# Patient Record
Sex: MALE | Race: WHITE | Employment: OTHER | URBAN - NONMETROPOLITAN AREA
[De-identification: names, ages, dates, MRNs, and addresses within clinical notes are randomized per-mention and may not be internally consistent; named-entity substitution may affect disease eponyms.]

---

## 2017-01-02 ENCOUNTER — OFFICE VISIT (OUTPATIENT)
Dept: URGENT CARE | Age: 37
End: 2017-01-02
Payer: MEDICARE

## 2017-01-02 VITALS
WEIGHT: 155 LBS | HEIGHT: 71 IN | DIASTOLIC BLOOD PRESSURE: 70 MMHG | SYSTOLIC BLOOD PRESSURE: 120 MMHG | RESPIRATION RATE: 18 BRPM | HEART RATE: 70 BPM | OXYGEN SATURATION: 99 % | BODY MASS INDEX: 21.7 KG/M2 | TEMPERATURE: 98 F

## 2017-01-02 DIAGNOSIS — L08.9 SKIN INFECTION: Primary | ICD-10-CM

## 2017-01-02 PROCEDURE — 99213 OFFICE O/P EST LOW 20 MIN: CPT | Performed by: NURSE PRACTITIONER

## 2017-01-02 RX ORDER — SULFAMETHOXAZOLE AND TRIMETHOPRIM 800; 160 MG/1; MG/1
1 TABLET ORAL 2 TIMES DAILY
Qty: 20 TABLET | Refills: 0 | Status: SHIPPED | OUTPATIENT
Start: 2017-01-02 | End: 2017-01-12

## 2017-01-02 ASSESSMENT — ENCOUNTER SYMPTOMS
COUGH: 0
SINUS PRESSURE: 0
GASTROINTESTINAL NEGATIVE: 1
SORE THROAT: 0
RHINORRHEA: 1

## 2017-01-27 ENCOUNTER — TELEPHONE (OUTPATIENT)
Dept: NEUROLOGY | Age: 37
End: 2017-01-27

## 2017-02-20 ENCOUNTER — OFFICE VISIT (OUTPATIENT)
Dept: NEUROLOGY | Age: 37
End: 2017-02-20
Payer: MEDICARE

## 2017-02-20 VITALS
WEIGHT: 160 LBS | BODY MASS INDEX: 22.4 KG/M2 | RESPIRATION RATE: 18 BRPM | HEIGHT: 71 IN | DIASTOLIC BLOOD PRESSURE: 72 MMHG | HEART RATE: 88 BPM | SYSTOLIC BLOOD PRESSURE: 114 MMHG

## 2017-02-20 DIAGNOSIS — F34.1 DYSTHYMIA: ICD-10-CM

## 2017-02-20 DIAGNOSIS — S06.9X5S TRAUMATIC BRAIN INJURY, WITH LOSS OF CONSCIOUSNESS GREATER THAN 24 HOURS WITH RETURN TO PRE-EXISTING CONSCIOUS LEVEL, SEQUELA (HCC): Primary | ICD-10-CM

## 2017-02-20 DIAGNOSIS — R41.3 MEMORY LOSS: ICD-10-CM

## 2017-02-20 DIAGNOSIS — H02.401 PTOSIS OF RIGHT EYELID: ICD-10-CM

## 2017-02-20 DIAGNOSIS — S06.9X5S TRAUMATIC BRAIN INJURY, WITH LOSS OF CONSCIOUSNESS GREATER THAN 24 HOURS WITH RETURN TO PRE-EXISTING CONSCIOUS LEVEL, SEQUELA (HCC): ICD-10-CM

## 2017-02-20 DIAGNOSIS — R43.0 ANOSMIA: ICD-10-CM

## 2017-02-20 LAB
ALBUMIN SERPL-MCNC: 4.8 G/DL (ref 3.5–5.2)
ALP BLD-CCNC: 94 U/L (ref 40–130)
ALT SERPL-CCNC: 13 U/L (ref 5–41)
ANION GAP SERPL CALCULATED.3IONS-SCNC: 16 MMOL/L (ref 7–19)
AST SERPL-CCNC: 15 U/L (ref 5–40)
BASOPHILS ABSOLUTE: 0 K/UL (ref 0–0.2)
BASOPHILS RELATIVE PERCENT: 0.3 % (ref 0–1)
BILIRUB SERPL-MCNC: 0.6 MG/DL (ref 0.2–1.2)
BUN BLDV-MCNC: 20 MG/DL (ref 6–20)
CALCIUM SERPL-MCNC: 9.8 MG/DL (ref 8.6–10)
CHLORIDE BLD-SCNC: 102 MMOL/L (ref 98–111)
CO2: 24 MMOL/L (ref 22–29)
CREAT SERPL-MCNC: 1 MG/DL (ref 0.5–1.2)
EOSINOPHILS ABSOLUTE: 0.1 K/UL (ref 0–0.6)
EOSINOPHILS RELATIVE PERCENT: 1.4 % (ref 0–5)
FOLATE: 15.7 NG/ML (ref 4.5–32.2)
GFR NON-AFRICAN AMERICAN: >60
GLOBULIN: 3 G/DL
GLUCOSE BLD-MCNC: 86 MG/DL (ref 74–109)
HCT VFR BLD CALC: 47.5 % (ref 42–52)
HEMOGLOBIN: 16.1 G/DL (ref 14–18)
LYMPHOCYTES ABSOLUTE: 1.5 K/UL (ref 1.1–4.5)
LYMPHOCYTES RELATIVE PERCENT: 18.9 % (ref 20–40)
MCH RBC QN AUTO: 30 PG (ref 27–31)
MCHC RBC AUTO-ENTMCNC: 33.9 G/DL (ref 33–37)
MCV RBC AUTO: 88.5 FL (ref 80–94)
MONOCYTES ABSOLUTE: 0.6 K/UL (ref 0–0.9)
MONOCYTES RELATIVE PERCENT: 7.1 % (ref 0–10)
NEUTROPHILS ABSOLUTE: 5.7 K/UL (ref 1.5–7.5)
NEUTROPHILS RELATIVE PERCENT: 72.3 % (ref 50–65)
PDW BLD-RTO: 13.5 % (ref 11.5–14.5)
PLATELET # BLD: 308 K/UL (ref 130–400)
PMV BLD AUTO: 10.9 FL (ref 7.4–10.4)
POTASSIUM SERPL-SCNC: 3.6 MMOL/L (ref 3.5–5)
RBC # BLD: 5.37 M/UL (ref 4.7–6.1)
SODIUM BLD-SCNC: 142 MMOL/L (ref 136–145)
TOTAL PROTEIN: 7.8 G/DL (ref 6.6–8.7)
TSH SERPL DL<=0.05 MIU/L-ACNC: 1.18 UIU/ML (ref 0.27–4.2)
VITAMIN B-12: 552 PG/ML (ref 211–946)
WBC # BLD: 7.9 K/UL (ref 4.8–10.8)

## 2017-02-20 PROCEDURE — 4004F PT TOBACCO SCREEN RCVD TLK: CPT | Performed by: PSYCHIATRY & NEUROLOGY

## 2017-02-20 PROCEDURE — 99214 OFFICE O/P EST MOD 30 MIN: CPT | Performed by: PSYCHIATRY & NEUROLOGY

## 2017-02-20 PROCEDURE — G8484 FLU IMMUNIZE NO ADMIN: HCPCS | Performed by: PSYCHIATRY & NEUROLOGY

## 2017-02-20 PROCEDURE — G8420 CALC BMI NORM PARAMETERS: HCPCS | Performed by: PSYCHIATRY & NEUROLOGY

## 2017-02-20 PROCEDURE — G8427 DOCREV CUR MEDS BY ELIG CLIN: HCPCS | Performed by: PSYCHIATRY & NEUROLOGY

## 2017-02-20 RX ORDER — CITALOPRAM 20 MG/1
20 TABLET ORAL DAILY
Qty: 30 TABLET | Refills: 3 | Status: SHIPPED | OUTPATIENT
Start: 2017-02-20 | End: 2017-06-08 | Stop reason: SDUPTHER

## 2017-07-14 ENCOUNTER — HOSPITAL ENCOUNTER (EMERGENCY)
Facility: HOSPITAL | Age: 37
Discharge: HOME | End: 2017-07-14
Admitting: EMERGENCY MEDICINE

## 2017-07-14 ENCOUNTER — APPOINTMENT (OUTPATIENT)
Dept: GENERAL RADIOLOGY | Facility: HOSPITAL | Age: 37
End: 2017-07-14

## 2017-07-14 VITALS
HEART RATE: 46 BPM | DIASTOLIC BLOOD PRESSURE: 71 MMHG | TEMPERATURE: 98 F | RESPIRATION RATE: 18 BRPM | WEIGHT: 153.13 LBS | OXYGEN SATURATION: 100 % | BODY MASS INDEX: 21.44 KG/M2 | HEIGHT: 71 IN | SYSTOLIC BLOOD PRESSURE: 107 MMHG

## 2017-07-14 DIAGNOSIS — S62.522B: Primary | ICD-10-CM

## 2017-07-14 PROCEDURE — 99283 EMERGENCY DEPT VISIT LOW MDM: CPT

## 2017-07-14 PROCEDURE — 90471 IMMUNIZATION ADMIN: CPT | Performed by: PHYSICIAN ASSISTANT

## 2017-07-14 PROCEDURE — 25010000002 KETOROLAC TROMETHAMINE PER 15 MG: Performed by: PHYSICIAN ASSISTANT

## 2017-07-14 PROCEDURE — 96372 THER/PROPH/DIAG INJ SC/IM: CPT

## 2017-07-14 PROCEDURE — 25010000002 TDAP 5-2.5-18.5 LF-MCG/0.5 SUSPENSION: Performed by: PHYSICIAN ASSISTANT

## 2017-07-14 PROCEDURE — 90715 TDAP VACCINE 7 YRS/> IM: CPT | Performed by: PHYSICIAN ASSISTANT

## 2017-07-14 PROCEDURE — 73140 X-RAY EXAM OF FINGER(S): CPT

## 2017-07-14 RX ORDER — IBUPROFEN 800 MG/1
800 TABLET ORAL
Qty: 25 TABLET | Refills: 0 | Status: ON HOLD | OUTPATIENT
Start: 2017-07-14 | End: 2023-03-27

## 2017-07-14 RX ORDER — CLINDAMYCIN HYDROCHLORIDE 300 MG/1
300 CAPSULE ORAL 3 TIMES DAILY
Qty: 21 CAPSULE | Refills: 0 | Status: SHIPPED | OUTPATIENT
Start: 2017-07-14 | End: 2017-07-21

## 2017-07-14 RX ORDER — KETOROLAC TROMETHAMINE 30 MG/ML
30 INJECTION, SOLUTION INTRAMUSCULAR; INTRAVENOUS ONCE
Status: COMPLETED | OUTPATIENT
Start: 2017-07-14 | End: 2017-07-14

## 2017-07-14 RX ORDER — DEXTROAMPHETAMINE SACCHARATE, AMPHETAMINE ASPARTATE, DEXTROAMPHETAMINE SULFATE AND AMPHETAMINE SULFATE 5; 5; 5; 5 MG/1; MG/1; MG/1; MG/1
30 TABLET ORAL 2 TIMES DAILY
Status: ON HOLD | COMMUNITY
End: 2023-03-27

## 2017-07-14 RX ADMIN — TETANUS TOXOID, REDUCED DIPHTHERIA TOXOID AND ACELLULAR PERTUSSIS VACCINE, ADSORBED 0.5 ML: 5; 2.5; 8; 8; 2.5 SUSPENSION INTRAMUSCULAR at 14:01

## 2017-07-14 RX ADMIN — KETOROLAC TROMETHAMINE 30 MG: 30 INJECTION, SOLUTION INTRAMUSCULAR at 15:12

## 2017-07-14 NOTE — ED NOTES
Patient discharged home  with significant other at side ambulatory to personal car. No distress noted. Personal belongings with patient. Patient voiced understanding to instructions given. Splint remains intact vascular checks intact.       Yasemin Palumbo RN  07/14/17 9541

## 2017-07-14 NOTE — DISCHARGE INSTRUCTIONS
Return with any signs of infection.  Wear splint at all times.  Do not soak in water.  Call Dr. Gunter Monday to set up appt next week for follow-up

## 2017-07-14 NOTE — ED PROVIDER NOTES
Subjective   HPI Comments: Patient cut his thumb in a  about 30 minutes prior to arrival    Patient is a 36 y.o. male presenting with upper extremity pain.   History provided by:  Patient   used: No    Upper Extremity Issue   Location:  Finger  Finger location:  L thumb  Pain details:     Quality:  Throbbing    Radiates to:  Does not radiate    Severity:  Moderate    Onset quality:  Sudden    Duration:  1 hour    Timing:  Constant    Progression:  Unchanged  Handedness:  Right-handed  Dislocation: no    Foreign body present:  No foreign bodies  Tetanus status:  Unknown  Prior injury to area:  No  Relieved by:  Nothing  Worsened by:  Movement  Ineffective treatments:  None tried  Associated symptoms: decreased range of motion    Associated symptoms: no back pain, no fatigue, no fever, no neck pain, no numbness, no stiffness, no swelling and no tingling        Review of Systems   Constitutional: Negative for fatigue and fever.   HENT: Negative.    Eyes: Negative.    Respiratory: Negative.    Gastrointestinal: Negative.    Endocrine: Negative.    Genitourinary: Negative.    Musculoskeletal: Positive for joint swelling. Negative for back pain, neck pain and stiffness.   Skin: Positive for wound.   Allergic/Immunologic: Negative.    Neurological: Negative.    Psychiatric/Behavioral: Negative.        Past Medical History:   Diagnosis Date   • Traumatic brain injury        Allergies   Allergen Reactions   • Aspirin    • Morphine And Related        Past Surgical History:   Procedure Laterality Date   • APPENDECTOMY         History reviewed. No pertinent family history.    Social History     Social History   • Marital status: Single     Spouse name: N/A   • Number of children: N/A   • Years of education: N/A     Social History Main Topics   • Smoking status: Current Some Day Smoker   • Smokeless tobacco: None   • Alcohol use No   • Drug use: Yes     Special: Marijuana      Comment: Daily user    • Sexual activity: Not Asked     Other Topics Concern   • None     Social History Narrative   • None           Objective   Physical Exam   Constitutional: He is oriented to person, place, and time. He appears well-developed and well-nourished. No distress.   HENT:   Head: Normocephalic and atraumatic.   Eyes: EOM are normal. Pupils are equal, round, and reactive to light.   Cardiovascular: Normal rate and regular rhythm.  Exam reveals no friction rub.    No murmur heard.  Pulmonary/Chest: Effort normal and breath sounds normal. No respiratory distress. He has no wheezes.   Musculoskeletal:        Left hand: He exhibits decreased range of motion, tenderness and laceration. He exhibits normal capillary refill and no swelling.        Hands:  Neurological: He is alert and oriented to person, place, and time.   Skin: Laceration noted. He is not diaphoretic.   Psychiatric: He has a normal mood and affect.   Nursing note and vitals reviewed.      Procedures         ED Course  ED Course   Comment By Time   Discussed with Dr. Gunter who reviewed films.  Per request, will wash, suture or dermabond, splint, and have patient follow up with Dr. Gunter in the office next week Geovany Elaine PA-C 07/14 4674   Patient had soaked in Betadine.  Additional betadine/saline solution used in irrigation. Discussed closure with patient.  He requested dermabond over sutures.  Site closed with Dermabond.  Will splint and have follow up with Dr. Gunter next week.  Patient requesting Ibuprofen 900 mg as he has a history of drug abuse and does not want narcotics Geovany Elaine PA-C 07/14 1986                  [facility]    Final diagnoses:   Open fracture of tuft of distal phalanx of left thumb, initial encounter            Geovany Elaine PA-C  07/14/17 1602       Geovany Elaine PA-C  08/31/17 0038

## 2017-07-14 NOTE — ED NOTES
C/O using  and cut his left thumb. Bleeding is controlled. Pt not sure if he is UTD on tetanus.     Yasemin Palumbo RN  07/14/17 0092

## 2021-05-31 ENCOUNTER — HOSPITAL ENCOUNTER (OUTPATIENT)
Dept: GENERAL RADIOLOGY | Age: 41
Discharge: HOME | End: 2021-05-31
Payer: MEDICARE

## 2021-05-31 DIAGNOSIS — M54.50 LOW BACK PAIN, UNSPECIFIED BACK PAIN LATERALITY, UNSPECIFIED CHRONICITY, UNSPECIFIED WHETHER SCIATICA PRESENT: ICD-10-CM

## 2021-05-31 PROCEDURE — 72100 X-RAY EXAM L-S SPINE 2/3 VWS: CPT

## 2021-07-19 ENCOUNTER — HOSPITAL ENCOUNTER (OUTPATIENT)
Dept: PHYSICAL THERAPY | Age: 41
Setting detail: THERAPIES SERIES
Discharge: HOME | End: 2021-07-19
Payer: MEDICARE

## 2021-07-19 PROCEDURE — 97162 PT EVAL MOD COMPLEX 30 MIN: CPT

## 2021-07-19 PROCEDURE — 97110 THERAPEUTIC EXERCISES: CPT
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
## 2021-07-22 ENCOUNTER — HOSPITAL ENCOUNTER (OUTPATIENT)
Dept: PHYSICAL THERAPY | Age: 41
Setting detail: THERAPIES SERIES
Discharge: HOME | End: 2021-07-22
Payer: MEDICARE

## 2021-07-22 PROCEDURE — 97110 THERAPEUTIC EXERCISES: CPT

## 2021-07-22 PROCEDURE — G0283 ELEC STIM OTHER THAN WOUND: HCPCS
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
## 2021-08-03 ENCOUNTER — HOSPITAL ENCOUNTER (OUTPATIENT)
Dept: PHYSICAL THERAPY | Age: 41
Setting detail: THERAPIES SERIES
Discharge: HOME | End: 2021-08-03
Payer: MEDICARE

## 2021-08-03 ENCOUNTER — APPOINTMENT (OUTPATIENT)
Dept: PHYSICAL THERAPY | Age: 41
End: 2021-08-03
Payer: MEDICARE

## 2021-08-03 PROCEDURE — 97110 THERAPEUTIC EXERCISES: CPT

## 2021-08-03 PROCEDURE — G0283 ELEC STIM OTHER THAN WOUND: HCPCS

## 2021-08-03 ASSESSMENT — PAIN DESCRIPTION - PROGRESSION: CLINICAL_PROGRESSION: NOT CHANGED

## 2021-08-03 ASSESSMENT — PAIN DESCRIPTION - PAIN TYPE: TYPE: CHRONIC PAIN

## 2021-08-03 ASSESSMENT — PAIN DESCRIPTION - ORIENTATION: ORIENTATION: LEFT

## 2021-08-03 ASSESSMENT — PAIN SCALES - GENERAL: PAINLEVEL_OUTOF10: 8

## 2021-08-03 ASSESSMENT — PAIN DESCRIPTION - ONSET: ONSET: ON-GOING

## 2021-08-03 ASSESSMENT — PAIN DESCRIPTION - LOCATION: LOCATION: FOOT;LEG

## 2021-08-03 ASSESSMENT — PAIN - FUNCTIONAL ASSESSMENT: PAIN_FUNCTIONAL_ASSESSMENT: PREVENTS OR INTERFERES SOME ACTIVE ACTIVITIES AND ADLS

## 2021-08-03 ASSESSMENT — PAIN DESCRIPTION - FREQUENCY: FREQUENCY: CONTINUOUS

## 2021-08-03 NOTE — PROGRESS NOTES
Daily Treatment Note  Date: 8/3/2021  Patient Name: Aurora Garg  MRN: [id number]     :   1980    Subjective:   General  Chart Reviewed: No  Response To Previous Treatment: Not applicable  Family / Caregiver Present: No  Referring Practitioner: Berta Heaton  PT Visit Information  Onset Date: 21  PT Insurance Information: Medicare  Total # of Visits Approved: 12  Total # of Visits to Date: 3  Plan of Care/Certification Expiration Date: 10/17/21  Progress Note Due Date: 21  Subjective  Subjective: Pt presents into clinic with QC and extremely unsteady gait. States he is unsure of what hand to walk with cane in. Pain Screening  Patient Currently in Pain: Yes (10/10 pre tx, 7/10 post tx)  Pain Assessment  Pain Assessment: 0-10  Pain Level: 8  Pain Type: Chronic pain  Pain Location: Foot;Leg  Pain Orientation: Left  Pain Radiating Towards: Sheen and top of L foot  Pain Frequency: Continuous  Pain Onset: On-going  Clinical Progression: Not changed  Functional Pain Assessment: Prevents or interferes some active activities and ADLs  Vital Signs  Patient Currently in Pain: Yes (10/10 pre tx, 7/10 post tx)       Treatment Activities:reatment Activities:                                    Exercises  Exercise 1: supine left knee push isometric into hip ext 5 sec x 5  ( x 4 throughout session) *check leg length and correct as needed throughout session- LLE longer at beginning of session. BLE More even throughout tx  Exercise 2: supine bilateral hs 90-90 manual stretch 4 x 15 sec ea modified for left  Exercise 3: supine bilateral trunk rotation 3\" X 10  Exercise 4: supine left quadratus stretch toward right side 4 x 15 sec  Exercise 5: supine manual hip distraction 4 x 15 sec  Exercise 6: supine posterior pelvic tilts 1 x 10  max v.c.   Exercise 7: supine ta contraction alone 10 sec x 5, alt ue 1 x 5, alt le 1 x 5, alt ue/le x 0  Exercise 8: seated pelvic tilts 1 x 10  Exercise 9: sit to stand with ta contraction 1 x 5 cga  Exercise 10: seated fwd flex to neutral and to right 5 sec  x 5 ea  Exercise 11: standing trunk rotation bilaterally 1 x 5  Exercise 12: standing side bending bilaterally 1 x 3  Exercise 13: standing back ext 1 x 5 *min extension acheived but still perform each visit  Exercise 14: standing multifitus pull down 1 x 5 ea- not today  Exercise 15: standing paloff press with red tband X5- only one sided today (difficult for pt) (possibly lower resistance next visit)  Exercise 16: estim IFC prn for pain in left low back region x 20 min- added MH today                             Assessment:   Conditions Requiring Skilled Therapeutic Intervention  Body structures, Functions, Activity limitations: Decreased functional mobility ; Decreased ADL status; Decreased ROM; Decreased strength;Decreased balance;Decreased sensation;Decreased posture; Increased pain;Decreased high-level IADLs  Assessment: Pt required mod-max v/c's for correct technique of ther-ex/stretches today. Pt amb. throughout clinic, with QC and states he is unsure of what hand or how to use AD. QC set up for pt to use on R side, and pt instructed some on correct gait. Treatment Diagnosis: lumbago with radiculopathy, sacral instability  REQUIRES PT FOLLOW UP: Yes  Treatment Initiated : ther-ex      Goals:  Short term goals  Time Frame for Short term goals: 4 Weeks  Short term goal 1: Patient to have increased lumbar rom flex 50 degrees, ext 25 degrees, rotation 100%  Short term goal 2: Patient to have decreased need for sacral mwm to correct rotation during tx  Short term goal 3: Patient to be independent with HEP  Long term goals  Time Frame for Long term goals : 6 Weeks  Long term goal 1: Patient to have more normalized gait with min limp noted with appropriate a.d.   Long term goal 2: Patient to report decreased pain after prolonged sitting  Long term goal 3: Patient to demo increased function by scoring <= 15% impairment on Oswestry  Patient Goals   Patient goals : decrease pain    Plan:    Plan  Times per week: 2x/week  Plan weeks: 6 weeks  Current Treatment Recommendations: Strengthening, ROM, Balance Training, Functional Mobility Training, Pain Management, Positioning, Modalities, Manual Therapy - Joint Manipulation, Safety Education & Training, Patient/Caregiver Education & Training, Manual Therapy - Soft Tissue Mobilization, Endurance Training, Neuromuscular Re-education, Home Exercise Program  Timed Code Treatment Minutes: 54 Minutes     Therapy Time   Individual Concurrent Group Co-treatment   Time In 0801         Time Out 0915         Minutes 74         Timed Code Treatment Minutes: 47 Minutes       Rebekah Gutierrez PTA  Electronically signed by Rebekah Gutierrez PTA on 8/3/2021 at 12:13 PM

## 2021-08-06 ENCOUNTER — APPOINTMENT (OUTPATIENT)
Dept: PHYSICAL THERAPY | Age: 41
End: 2021-08-06
Payer: MEDICARE

## 2021-08-23 NOTE — PROGRESS NOTES
[facility]  OUTPATIENT PHYSICAL THERAPY  DISCHARGE SUMMARY    Date: 2021  Patient Name: Fe Peraza        MRN: [id number]    ACCOUNT #: [de-identified]  : 1980  (36 y.o.)  Gender: male   Referring Practitioner: Annika Jones  Diagnosis: M54.5 LBP  Referral Date : 21  Treatment Diagnosis: lumbago with radiculopathy, sacral instability    Total # of Visits Approved: 12  Total # of Visits to Date: 3    Subjective  Subjective: Pt presents into clinic with QC and extremely unsteady gait. States he is unsure of what hand to walk with cane in.       Objective  Treatments received include: ther-ex, estim with moist heat  See objective/subjective data in goals    Assessment  Assessment: Pt required mod-max v/c's for correct technique of ther-ex/stretches today. Pt amb. throughout clinic, with QC and states he is unsure of what hand or how to use AD. QC set up for pt to use on R side, and pt instructed some on correct gait, documented last attended visit. Patient did not return after 3rd visit therefore d/c planned at this time and goals unmet.            Plan  D/C secondary to patient not returning         Goals  Short term goals  Time Frame for Short term goals: 4 Weeks  Short term goal 1: Patient to have increased lumbar rom flex 50 degrees, ext 25 degrees, rotation 100%- NOT MET  Short term goal 2: Patient to have decreased need for sacral mwm to correct rotation during tx- NOT MET  Short term goal 3: Patient to be independent with HEP- NOT MET    Long term goals  Time Frame for Long term goals : 6 Weeks  Long term goal 1: Patient to have more normalized gait with min limp noted with appropriate a.d.- NOT MET  Long term goal 2: Patient to report decreased pain after prolonged sitting- NOT MET  Long term goal 3: Patient to demo increased function by scoring <= 15% impairment on Oswestry- NOT MET         Electronically signed by Abdirahman Elmore PT on 2021 at 11:38 AM

## 2021-10-27 ENCOUNTER — HOSPITAL ENCOUNTER (OUTPATIENT)
Dept: GENERAL RADIOLOGY | Facility: HOSPITAL | Age: 41
Discharge: HOME | End: 2021-10-27
Admitting: NURSE PRACTITIONER

## 2021-10-27 ENCOUNTER — TRANSCRIBE ORDERS (OUTPATIENT)
Dept: ADMINISTRATIVE | Facility: HOSPITAL | Age: 41
End: 2021-10-27

## 2021-10-27 DIAGNOSIS — M25.552 PAIN IN LEFT HIP: ICD-10-CM

## 2021-10-27 DIAGNOSIS — M25.552 PAIN IN LEFT HIP: Primary | ICD-10-CM

## 2021-10-27 PROCEDURE — 73502 X-RAY EXAM HIP UNI 2-3 VIEWS: CPT

## 2023-01-06 ENCOUNTER — APPOINTMENT (OUTPATIENT)
Dept: CT IMAGING | Age: 43
End: 2023-01-06
Payer: COMMERCIAL

## 2023-01-06 ENCOUNTER — APPOINTMENT (OUTPATIENT)
Dept: GENERAL RADIOLOGY | Age: 43
End: 2023-01-06
Payer: COMMERCIAL

## 2023-01-06 ENCOUNTER — HOSPITAL ENCOUNTER (EMERGENCY)
Age: 43
Discharge: HOME | End: 2023-01-06
Payer: COMMERCIAL

## 2023-01-06 VITALS
SYSTOLIC BLOOD PRESSURE: 121 MMHG | RESPIRATION RATE: 18 BRPM | WEIGHT: 165 LBS | DIASTOLIC BLOOD PRESSURE: 89 MMHG | HEIGHT: 71 IN | OXYGEN SATURATION: 99 % | HEART RATE: 60 BPM | TEMPERATURE: 97.8 F | BODY MASS INDEX: 23.1 KG/M2

## 2023-01-06 DIAGNOSIS — M51.36 BULGING LUMBAR DISC: ICD-10-CM

## 2023-01-06 DIAGNOSIS — M51.36 DDD (DEGENERATIVE DISC DISEASE), LUMBAR: ICD-10-CM
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
## 2023-01-06 PROCEDURE — 72125 CT NECK SPINE W/O DYE: CPT

## 2023-01-06 PROCEDURE — 73090 X-RAY EXAM OF FOREARM: CPT

## 2023-01-06 PROCEDURE — 73560 X-RAY EXAM OF KNEE 1 OR 2: CPT

## 2023-01-06 PROCEDURE — 73560 X-RAY EXAM OF KNEE 1 OR 2: CPT | Performed by: RADIOLOGY

## 2023-01-06 PROCEDURE — 72131 CT LUMBAR SPINE W/O DYE: CPT

## 2023-01-06 PROCEDURE — 72131 CT LUMBAR SPINE W/O DYE: CPT | Performed by: RADIOLOGY

## 2023-01-06 PROCEDURE — 70450 CT HEAD/BRAIN W/O DYE: CPT

## 2023-01-06 PROCEDURE — 72125 CT NECK SPINE W/O DYE: CPT | Performed by: RADIOLOGY

## 2023-01-06 PROCEDURE — 71045 X-RAY EXAM CHEST 1 VIEW: CPT | Performed by: RADIOLOGY

## 2023-01-06 PROCEDURE — 99284 EMERGENCY DEPT VISIT MOD MDM: CPT

## 2023-01-06 PROCEDURE — 70450 CT HEAD/BRAIN W/O DYE: CPT | Performed by: RADIOLOGY

## 2023-01-06 PROCEDURE — 71045 X-RAY EXAM CHEST 1 VIEW: CPT

## 2023-01-06 PROCEDURE — 6360000002 HC RX W HCPCS: Performed by: PHYSICIAN ASSISTANT

## 2023-01-06 PROCEDURE — 96372 THER/PROPH/DIAG INJ SC/IM: CPT

## 2023-01-06 PROCEDURE — 73090 X-RAY EXAM OF FOREARM: CPT | Performed by: RADIOLOGY

## 2023-01-06 RX ORDER — PREDNISONE 10 MG/1
10 TABLET ORAL DAILY
Qty: 10 TABLET | Refills: 0 | Status: SHIPPED | OUTPATIENT
Start: 2023-01-06 | End: 2023-01-16

## 2023-01-06 RX ORDER — ORPHENADRINE CITRATE 30 MG/ML
60 INJECTION INTRAMUSCULAR; INTRAVENOUS ONCE
Status: COMPLETED | OUTPATIENT
Start: 2023-01-06 | End: 2023-01-06

## 2023-01-06 RX ORDER — DEXAMETHASONE SODIUM PHOSPHATE 10 MG/ML
4 INJECTION, SOLUTION INTRAMUSCULAR; INTRAVENOUS ONCE
Status: COMPLETED | OUTPATIENT
Start: 2023-01-06 | End: 2023-01-06

## 2023-01-06 RX ORDER — CYCLOBENZAPRINE HCL 10 MG
10 TABLET ORAL 3 TIMES DAILY PRN
Qty: 30 TABLET | Refills: 0 | Status: SHIPPED | OUTPATIENT
Start: 2023-01-06 | End: 2023-01-16

## 2023-01-06 RX ADMIN — ORPHENADRINE CITRATE 60 MG: 30 INJECTION INTRAMUSCULAR; INTRAVENOUS at 19:37

## 2023-01-06 RX ADMIN — DEXAMETHASONE SODIUM PHOSPHATE 4 MG: 10 INJECTION, SOLUTION INTRAMUSCULAR; INTRAVENOUS at 19:36

## 2023-01-06 ASSESSMENT — PAIN DESCRIPTION - LOCATION: LOCATION: ARM;NECK;KNEE

## 2023-01-06 ASSESSMENT — ENCOUNTER SYMPTOMS
APNEA: 0
SHORTNESS OF BREATH: 0
EYE ITCHING: 0
BACK PAIN: 1
COUGH: 0
COLOR CHANGE: 0
EYE DISCHARGE: 0
PHOTOPHOBIA: 0

## 2023-01-06 ASSESSMENT — PAIN DESCRIPTION - DESCRIPTORS: DESCRIPTORS: TINGLING

## 2023-01-06 ASSESSMENT — PAIN - FUNCTIONAL ASSESSMENT: PAIN_FUNCTIONAL_ASSESSMENT: 0-10

## 2023-01-06 ASSESSMENT — PAIN SCALES - GENERAL: PAINLEVEL_OUTOF10: 6
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
## 2023-03-21 ENCOUNTER — APPOINTMENT (OUTPATIENT)
Dept: GENERAL RADIOLOGY | Age: 43
End: 2023-03-21
Payer: MEDICARE

## 2023-03-21 ENCOUNTER — HOSPITAL ENCOUNTER (EMERGENCY)
Age: 43
Discharge: HOME | End: 2023-03-21
Payer: MEDICARE

## 2023-03-21 VITALS
SYSTOLIC BLOOD PRESSURE: 112 MMHG | OXYGEN SATURATION: 98 % | RESPIRATION RATE: 18 BRPM | TEMPERATURE: 97.8 F | DIASTOLIC BLOOD PRESSURE: 87 MMHG | HEART RATE: 84 BPM

## 2023-03-21 DIAGNOSIS — T14.8XXA HEMATOMA AND CONTUSION: Primary | ICD-10-CM

## 2023-03-21 PROCEDURE — 99283 EMERGENCY DEPT VISIT LOW MDM: CPT

## 2023-03-21 PROCEDURE — 73130 X-RAY EXAM OF HAND: CPT

## 2023-03-21 RX ORDER — CEPHALEXIN 500 MG/1
500 CAPSULE ORAL 2 TIMES DAILY
Qty: 14 CAPSULE | Refills: 0 | Status: SHIPPED | OUTPATIENT
Start: 2023-03-21 | End: 2023-03-28

## 2023-03-21 ASSESSMENT — PAIN SCALES - GENERAL: PAINLEVEL_OUTOF10: 5

## 2023-03-21 ASSESSMENT — PAIN - FUNCTIONAL ASSESSMENT: PAIN_FUNCTIONAL_ASSESSMENT: 0-10

## 2023-03-21 NOTE — Clinical Note
Marcelino Combs was seen and treated in our emergency department on 3/21/2023. He may return to work on 03/24/2023. If you have any questions or concerns, please don't hesitate to call.       Abilio Amato, [address]

## 2023-03-21 NOTE — ED PROVIDER NOTES
understanding. All questions were answered. No other signs of trauma or complaints of pain warranting further in the ER today. FINAL IMPRESSION      1.  Hematoma and contusion          DISPOSITION/PLAN   DISPOSITION Decision To Discharge 03/21/2023 04:18:57 PM      PATIENT REFERRED TO:  Janett Olguin MD  [facility]  [phone]    Call in 1 day      DISCHARGE MEDICATIONS:  Discharge Medication List as of 3/21/2023  4:31 PM        START taking these medications    Details   cephALEXin (KEFLEX) 500 MG capsule Take 1 capsule by mouth 2 times daily for 7 days, Disp-14 capsule, R-0Normal                (Please note that portions of this note were completed with a voice recognition program.  Efforts were made to edit thedictations but occasionally words are mis-transcribed.)    DONALD Chaparro (electronically signed)     Munir Posada, [address]  03/21/23 2599

## 2023-03-21 NOTE — DISCHARGE INSTRUCTIONS
Follow up with Dr Margarita Juarez as soon as possible. Return to the ER for new or worsening symptoms. Take antibiotic as prescribed.

## 2023-03-27 ENCOUNTER — HOSPITAL ENCOUNTER (OUTPATIENT)
Facility: HOSPITAL | Age: 43
Setting detail: HOSPITAL OUTPATIENT SURGERY
Discharge: HOME | End: 2023-03-27
Attending: ORTHOPAEDIC SURGERY | Admitting: ORTHOPAEDIC SURGERY
Payer: MEDICARE

## 2023-03-27 ENCOUNTER — ANESTHESIA EVENT (OUTPATIENT)
Dept: PERIOP | Facility: HOSPITAL | Age: 43
End: 2023-03-27
Payer: MEDICARE

## 2023-03-27 ENCOUNTER — ANESTHESIA (OUTPATIENT)
Dept: PERIOP | Facility: HOSPITAL | Age: 43
End: 2023-03-27
Payer: MEDICARE

## 2023-03-27 VITALS
HEART RATE: 73 BPM | OXYGEN SATURATION: 100 % | DIASTOLIC BLOOD PRESSURE: 68 MMHG | WEIGHT: 166.67 LBS | HEIGHT: 72 IN | BODY MASS INDEX: 22.57 KG/M2 | SYSTOLIC BLOOD PRESSURE: 130 MMHG | RESPIRATION RATE: 16 BRPM | TEMPERATURE: 97.7 F

## 2023-03-27 PROBLEM — L03.011 FELON OF FINGER OF RIGHT HAND: Status: ACTIVE | Noted: 2023-03-27

## 2023-03-27 LAB
DEPRECATED RDW RBC AUTO: 40.1 FL (ref 37–54)
ERYTHROCYTE [DISTWIDTH] IN BLOOD BY AUTOMATED COUNT: 12.7 % (ref 12.3–15.4)
HCT VFR BLD AUTO: 45.9 % (ref 37.5–51)
HGB BLD-MCNC: 14.6 G/DL (ref 13–17.7)
MCH RBC QN AUTO: 27.7 PG (ref 26.6–33)
MCHC RBC AUTO-ENTMCNC: 31.8 G/DL (ref 31.5–35.7)
MCV RBC AUTO: 87.1 FL (ref 79–97)
PLATELET # BLD AUTO: 384 10*3/MM3 (ref 140–450)
PMV BLD AUTO: 8.7 FL (ref 6–12)
RBC # BLD AUTO: 5.27 10*6/MM3 (ref 4.14–5.8)
WBC NRBC COR # BLD: 7.57 10*3/MM3 (ref 3.4–10.8)

## 2023-03-27 PROCEDURE — 25010000002 FENTANYL CITRATE (PF) 100 MCG/2ML SOLUTION: Performed by: NURSE ANESTHETIST, CERTIFIED REGISTERED

## 2023-03-27 PROCEDURE — 25010000002 CEFAZOLIN PER 500 MG: Performed by: NURSE ANESTHETIST, CERTIFIED REGISTERED

## 2023-03-27 PROCEDURE — 85027 COMPLETE CBC AUTOMATED: CPT | Performed by: ORTHOPAEDIC SURGERY

## 2023-03-27 PROCEDURE — 25010000002 PROPOFOL 10 MG/ML EMULSION: Performed by: NURSE ANESTHETIST, CERTIFIED REGISTERED

## 2023-03-27 PROCEDURE — 25010000002 DEXAMETHASONE PER 1 MG: Performed by: ANESTHESIOLOGY

## 2023-03-27 RX ORDER — SODIUM CHLORIDE, SODIUM LACTATE, POTASSIUM CHLORIDE, CALCIUM CHLORIDE 600; 310; 30; 20 MG/100ML; MG/100ML; MG/100ML; MG/100ML
INJECTION, SOLUTION INTRAVENOUS CONTINUOUS PRN
Status: DISCONTINUED | OUTPATIENT
Start: 2023-03-27 | End: 2023-03-27 | Stop reason: SURG

## 2023-03-27 RX ORDER — DROPERIDOL 2.5 MG/ML
0.62 INJECTION, SOLUTION INTRAMUSCULAR; INTRAVENOUS ONCE AS NEEDED
Status: DISCONTINUED | OUTPATIENT
Start: 2023-03-27 | End: 2023-03-27 | Stop reason: HOSPADM

## 2023-03-27 RX ORDER — IBUPROFEN 600 MG/1
600 TABLET ORAL ONCE AS NEEDED
Status: DISCONTINUED | OUTPATIENT
Start: 2023-03-27 | End: 2023-03-27 | Stop reason: HOSPADM

## 2023-03-27 RX ORDER — LIDOCAINE HYDROCHLORIDE 10 MG/ML
0.5 INJECTION, SOLUTION EPIDURAL; INFILTRATION; INTRACAUDAL; PERINEURAL ONCE AS NEEDED
Status: DISCONTINUED | OUTPATIENT
Start: 2023-03-27 | End: 2023-03-27 | Stop reason: HOSPADM

## 2023-03-27 RX ORDER — SODIUM CHLORIDE 0.9 % (FLUSH) 0.9 %
3 SYRINGE (ML) INJECTION AS NEEDED
Status: DISCONTINUED | OUTPATIENT
Start: 2023-03-27 | End: 2023-03-27 | Stop reason: HOSPADM

## 2023-03-27 RX ORDER — LABETALOL HYDROCHLORIDE 5 MG/ML
5 INJECTION, SOLUTION INTRAVENOUS
Status: DISCONTINUED | OUTPATIENT
Start: 2023-03-27 | End: 2023-03-27 | Stop reason: HOSPADM

## 2023-03-27 RX ORDER — SODIUM CHLORIDE 9 MG/ML
40 INJECTION, SOLUTION INTRAVENOUS AS NEEDED
Status: DISCONTINUED | OUTPATIENT
Start: 2023-03-27 | End: 2023-03-27 | Stop reason: HOSPADM

## 2023-03-27 RX ORDER — LIDOCAINE HYDROCHLORIDE 20 MG/ML
INJECTION, SOLUTION EPIDURAL; INFILTRATION; INTRACAUDAL; PERINEURAL AS NEEDED
Status: DISCONTINUED | OUTPATIENT
Start: 2023-03-27 | End: 2023-03-27 | Stop reason: SURG

## 2023-03-27 RX ORDER — MAGNESIUM HYDROXIDE 1200 MG/15ML
LIQUID ORAL AS NEEDED
Status: DISCONTINUED | OUTPATIENT
Start: 2023-03-27 | End: 2023-03-27 | Stop reason: HOSPADM

## 2023-03-27 RX ORDER — ACETAMINOPHEN 160 MG
TABLET,DISINTEGRATING ORAL AS NEEDED
Status: DISCONTINUED | OUTPATIENT
Start: 2023-03-27 | End: 2023-03-27 | Stop reason: HOSPADM

## 2023-03-27 RX ORDER — SODIUM CHLORIDE 0.9 % (FLUSH) 0.9 %
10 SYRINGE (ML) INJECTION AS NEEDED
Status: DISCONTINUED | OUTPATIENT
Start: 2023-03-27 | End: 2023-03-27 | Stop reason: HOSPADM

## 2023-03-27 RX ORDER — SODIUM CHLORIDE, SODIUM LACTATE, POTASSIUM CHLORIDE, CALCIUM CHLORIDE 600; 310; 30; 20 MG/100ML; MG/100ML; MG/100ML; MG/100ML
1000 INJECTION, SOLUTION INTRAVENOUS CONTINUOUS
Status: DISCONTINUED | OUTPATIENT
Start: 2023-03-27 | End: 2023-03-27 | Stop reason: HOSPADM

## 2023-03-27 RX ORDER — ONDANSETRON 2 MG/ML
4 INJECTION INTRAMUSCULAR; INTRAVENOUS ONCE AS NEEDED
Status: DISCONTINUED | OUTPATIENT
Start: 2023-03-27 | End: 2023-03-27 | Stop reason: HOSPADM

## 2023-03-27 RX ORDER — MIDAZOLAM HYDROCHLORIDE 1 MG/ML
2 INJECTION INTRAMUSCULAR; INTRAVENOUS
Status: DISCONTINUED | OUTPATIENT
Start: 2023-03-27 | End: 2023-03-27 | Stop reason: HOSPADM

## 2023-03-27 RX ORDER — SODIUM CHLORIDE, SODIUM LACTATE, POTASSIUM CHLORIDE, CALCIUM CHLORIDE 600; 310; 30; 20 MG/100ML; MG/100ML; MG/100ML; MG/100ML
100 INJECTION, SOLUTION INTRAVENOUS CONTINUOUS PRN
Status: DISCONTINUED | OUTPATIENT
Start: 2023-03-27 | End: 2023-03-27 | Stop reason: HOSPADM

## 2023-03-27 RX ORDER — FENTANYL CITRATE 50 UG/ML
25 INJECTION, SOLUTION INTRAMUSCULAR; INTRAVENOUS
Status: DISCONTINUED | OUTPATIENT
Start: 2023-03-27 | End: 2023-03-27 | Stop reason: HOSPADM

## 2023-03-27 RX ORDER — NALOXONE HCL 0.4 MG/ML
0.4 VIAL (ML) INJECTION AS NEEDED
Status: DISCONTINUED | OUTPATIENT
Start: 2023-03-27 | End: 2023-03-27 | Stop reason: HOSPADM

## 2023-03-27 RX ORDER — SODIUM CHLORIDE 0.9 % (FLUSH) 0.9 %
10 SYRINGE (ML) INJECTION EVERY 12 HOURS SCHEDULED
Status: DISCONTINUED | OUTPATIENT
Start: 2023-03-27 | End: 2023-03-27 | Stop reason: HOSPADM

## 2023-03-27 RX ORDER — PROPOFOL 10 MG/ML
VIAL (ML) INTRAVENOUS AS NEEDED
Status: DISCONTINUED | OUTPATIENT
Start: 2023-03-27 | End: 2023-03-27 | Stop reason: SURG

## 2023-03-27 RX ORDER — CEFAZOLIN SODIUM 1 G/3ML
INJECTION, POWDER, FOR SOLUTION INTRAMUSCULAR; INTRAVENOUS AS NEEDED
Status: DISCONTINUED | OUTPATIENT
Start: 2023-03-27 | End: 2023-03-27 | Stop reason: SURG

## 2023-03-27 RX ORDER — FENTANYL CITRATE 50 UG/ML
INJECTION, SOLUTION INTRAMUSCULAR; INTRAVENOUS AS NEEDED
Status: DISCONTINUED | OUTPATIENT
Start: 2023-03-27 | End: 2023-03-27 | Stop reason: SURG

## 2023-03-27 RX ORDER — CEPHALEXIN 500 MG/1
500 CAPSULE ORAL 2 TIMES DAILY
COMMUNITY

## 2023-03-27 RX ORDER — SODIUM CHLORIDE 0.9 % (FLUSH) 0.9 %
3 SYRINGE (ML) INJECTION EVERY 12 HOURS SCHEDULED
Status: DISCONTINUED | OUTPATIENT
Start: 2023-03-27 | End: 2023-03-27 | Stop reason: HOSPADM

## 2023-03-27 RX ORDER — OXYCODONE AND ACETAMINOPHEN 10; 325 MG/1; MG/1
1 TABLET ORAL ONCE AS NEEDED
Status: DISCONTINUED | OUTPATIENT
Start: 2023-03-27 | End: 2023-03-27 | Stop reason: HOSPADM

## 2023-03-27 RX ORDER — ACETAMINOPHEN 500 MG
1000 TABLET ORAL ONCE
Status: COMPLETED | OUTPATIENT
Start: 2023-03-27 | End: 2023-03-27

## 2023-03-27 RX ORDER — SODIUM CHLORIDE 0.9 % (FLUSH) 0.9 %
3-10 SYRINGE (ML) INJECTION AS NEEDED
Status: DISCONTINUED | OUTPATIENT
Start: 2023-03-27 | End: 2023-03-27 | Stop reason: HOSPADM

## 2023-03-27 RX ORDER — POVIDONE-IODINE 10 MG/G
OINTMENT TOPICAL AS NEEDED
Status: DISCONTINUED | OUTPATIENT
Start: 2023-03-27 | End: 2023-03-27 | Stop reason: HOSPADM

## 2023-03-27 RX ORDER — OXYCODONE AND ACETAMINOPHEN 7.5; 325 MG/1; MG/1
2 TABLET ORAL EVERY 4 HOURS PRN
Status: DISCONTINUED | OUTPATIENT
Start: 2023-03-27 | End: 2023-03-27 | Stop reason: HOSPADM

## 2023-03-27 RX ORDER — GABAPENTIN 400 MG/1
400 CAPSULE ORAL 3 TIMES DAILY
COMMUNITY

## 2023-03-27 RX ORDER — FLUMAZENIL 0.1 MG/ML
0.2 INJECTION INTRAVENOUS AS NEEDED
Status: DISCONTINUED | OUTPATIENT
Start: 2023-03-27 | End: 2023-03-27 | Stop reason: HOSPADM

## 2023-03-27 RX ORDER — SODIUM CHLORIDE, SODIUM LACTATE, POTASSIUM CHLORIDE, CALCIUM CHLORIDE 600; 310; 30; 20 MG/100ML; MG/100ML; MG/100ML; MG/100ML
100 INJECTION, SOLUTION INTRAVENOUS CONTINUOUS
Status: DISCONTINUED | OUTPATIENT
Start: 2023-03-27 | End: 2023-03-27 | Stop reason: HOSPADM

## 2023-03-27 RX ORDER — DEXAMETHASONE SODIUM PHOSPHATE 4 MG/ML
4 INJECTION, SOLUTION INTRA-ARTICULAR; INTRALESIONAL; INTRAMUSCULAR; INTRAVENOUS; SOFT TISSUE ONCE AS NEEDED
Status: COMPLETED | OUTPATIENT
Start: 2023-03-27 | End: 2023-03-27

## 2023-03-27 RX ADMIN — SODIUM CHLORIDE, POTASSIUM CHLORIDE, SODIUM LACTATE AND CALCIUM CHLORIDE 1000 ML: 600; 310; 30; 20 INJECTION, SOLUTION INTRAVENOUS at 12:17

## 2023-03-27 RX ADMIN — FENTANYL CITRATE 100 MCG: 50 INJECTION INTRAMUSCULAR; INTRAVENOUS at 13:43

## 2023-03-27 RX ADMIN — CEFAZOLIN 2 G: 330 INJECTION, POWDER, FOR SOLUTION INTRAMUSCULAR; INTRAVENOUS at 13:39

## 2023-03-27 RX ADMIN — PROPOFOL INJECTABLE EMULSION 200 MG: 10 INJECTION, EMULSION INTRAVENOUS at 13:43

## 2023-03-27 RX ADMIN — ACETAMINOPHEN 1000 MG: 500 TABLET, FILM COATED ORAL at 12:45

## 2023-03-27 RX ADMIN — LIDOCAINE HYDROCHLORIDE 100 MG: 20 INJECTION, SOLUTION EPIDURAL; INFILTRATION; INTRACAUDAL; PERINEURAL at 13:43

## 2023-03-27 RX ADMIN — DEXAMETHASONE SODIUM PHOSPHATE 4 MG: 4 INJECTION, SOLUTION INTRAMUSCULAR; INTRAVENOUS at 12:45

## 2023-03-27 RX ADMIN — SODIUM CHLORIDE, POTASSIUM CHLORIDE, SODIUM LACTATE AND CALCIUM CHLORIDE: 600; 310; 30; 20 INJECTION, SOLUTION INTRAVENOUS at 13:39

## 2023-03-27 NOTE — ANESTHESIA PROCEDURE NOTES
Airway  Urgency: elective    Date/Time: 3/27/2023 1:43 PM  Airway not difficult    General Information and Staff    Patient location during procedure: OR  CRNA/CAA: Humphrey Iraheta CRNA    Indications and Patient Condition  Indications for airway management: airway protection    Preoxygenated: yes  MILS maintained throughout  Mask difficulty assessment: 1 - vent by mask    Final Airway Details  Final airway type: supraglottic airway      Successful airway: unique  Size 5     Number of attempts at approach: 1  Assessment: lips, teeth, and gum same as pre-op and atraumatic intubation
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
## 2023-03-27 NOTE — ANESTHESIA PREPROCEDURE EVALUATION
Anesthesia Evaluation     Patient summary reviewed   no history of anesthetic complications:  NPO Solid Status: > 8 hours             Airway   Mallampati: I  TM distance: >3 FB  Dental    (+) poor dentition        Pulmonary    (+) a smoker Current,   Cardiovascular   Exercise tolerance: good (4-7 METS)    (-) hypertension, hyperlipidemia      Neuro/Psych  (+) seizures,      ROS Comment: Traumatic brain injury- 2003, 2013  GI/Hepatic/Renal/Endo    (-) liver disease, no renal disease, diabetes    Musculoskeletal     Abdominal    Substance History      OB/GYN          Other                        Anesthesia Plan    ASA 2     general     intravenous induction     Anesthetic plan, risks, benefits, and alternatives have been provided, discussed and informed consent has been obtained with: patient.        CODE STATUS:

## 2023-03-27 NOTE — ANESTHESIA POSTPROCEDURE EVALUATION
"Patient: Kingston Shaw    Procedure Summary     Date: 03/27/23 Room / Location:  [facility] OR  /  [facility] OR    Anesthesia Start: 1339 Anesthesia Stop: 1416    Procedure: RIGHT MIDDLE FINGER INCISION AND DRAINAGE (Right: Finger Middle) Diagnosis: (L03.011)    Surgeons: Adriano Whitt MD Provider: Humphrey Iraheta CRNA    Anesthesia Type: general ASA Status: 2          Anesthesia Type: general    Vitals  No vitals data found for the desired time range.          Post Anesthesia Care and Evaluation    Patient location during evaluation: PACU  Patient participation: complete - patient participated  Level of consciousness: awake and alert  Pain management: adequate    Airway patency: patent  Anesthetic complications: No anesthetic complications    Cardiovascular status: acceptable  Respiratory status: acceptable  Hydration status: acceptable    Comments: Blood pressure 133/87, pulse 67, temperature 97.2 °F (36.2 °C), temperature source Temporal, resp. rate 16, height 182 cm (71.65\"), weight 75.6 kg (166 lb 10.7 oz), SpO2 100 %.    Pt discharged from PACU based on odilon score >8      "

## 2023-03-27 NOTE — DISCHARGE INSTRUCTIONS
1.  Elevate operative upper extremity.  Encourage gentle finger range of motion.  2.  No lifting greater than 5-10 pounds with the operative hand until follow-up.  3.  May remove dressing on postoperative day 1. Then, continue with daily dressing changes. Do NOT apply lotions or antibacterial ointments.  Begin incremental packing removal on postoperative day 1 with each dressing change, remove approximately 1 inch of packing with each dressing change.  All packing should be removed by postoperative day 3.  4.  Pain medication as prescribed.  No additional Tylenol, alcohol or driving while on opioid pain medication.  Wean off pain medication as able.  5.  Return to clinic in 2 weeks for wound check and likely suture removal.  6.  Call our clinic number in the interim with any questions or concerns.

## 2024-05-03 ENCOUNTER — TRANSCRIBE ORDERS (OUTPATIENT)
Dept: GENERAL RADIOLOGY | Facility: HOSPITAL | Age: 44
End: 2024-05-03
Payer: MEDICARE

## 2024-05-03 DIAGNOSIS — M54.50 PAIN, LUMBAR REGION: Primary | ICD-10-CM

## 2024-05-23 ENCOUNTER — HOSPITAL ENCOUNTER (EMERGENCY)
Age: 44
Discharge: HOME | End: 2024-05-23
Payer: MEDICARE

## 2024-05-23 VITALS
WEIGHT: 185 LBS | RESPIRATION RATE: 18 BRPM | HEART RATE: 65 BPM | TEMPERATURE: 98.4 F | OXYGEN SATURATION: 100 % | SYSTOLIC BLOOD PRESSURE: 140 MMHG | BODY MASS INDEX: 25.8 KG/M2 | DIASTOLIC BLOOD PRESSURE: 78 MMHG

## 2024-05-23 DIAGNOSIS — S41.111A LACERATION OF RIGHT UPPER EXTREMITY, INITIAL ENCOUNTER: ICD-10-CM

## 2024-05-23 DIAGNOSIS — Z23 NEED FOR TETANUS BOOSTER: Primary | ICD-10-CM

## 2024-05-23 PROCEDURE — 12001 RPR S/N/AX/GEN/TRNK 2.5CM/<: CPT

## 2024-05-23 PROCEDURE — 2500000003 HC RX 250 WO HCPCS

## 2024-05-23 PROCEDURE — 99283 EMERGENCY DEPT VISIT LOW MDM: CPT

## 2024-05-23 RX ORDER — CEPHALEXIN 500 MG/1
500 CAPSULE ORAL 3 TIMES DAILY
Qty: 21 CAPSULE | Refills: 0 | Status: SHIPPED | OUTPATIENT
Start: 2024-05-23 | End: 2024-05-30

## 2024-05-23 RX ORDER — LIDOCAINE HYDROCHLORIDE 10 MG/ML
INJECTION, SOLUTION EPIDURAL; INFILTRATION; INTRACAUDAL; PERINEURAL
Status: COMPLETED
Start: 2024-05-23 | End: 2024-05-23

## 2024-05-23 RX ORDER — LIDOCAINE HYDROCHLORIDE 10 MG/ML
5 INJECTION, SOLUTION EPIDURAL; INFILTRATION; INTRACAUDAL; PERINEURAL ONCE
Status: COMPLETED | OUTPATIENT
Start: 2024-05-23 | End: 2024-05-23

## 2024-05-23 RX ADMIN — LIDOCAINE HYDROCHLORIDE: 10 INJECTION, SOLUTION EPIDURAL; INFILTRATION; INTRACAUDAL at 12:24

## 2024-05-23 RX ADMIN — LIDOCAINE HYDROCHLORIDE: 10 INJECTION, SOLUTION EPIDURAL; INFILTRATION; INTRACAUDAL; PERINEURAL at 12:24
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
## 2024-05-31 ENCOUNTER — HOSPITAL ENCOUNTER (EMERGENCY)
Age: 44
Discharge: HOME | End: 2024-05-31
Attending: EMERGENCY MEDICINE
Payer: MEDICARE

## 2024-05-31 VITALS
HEART RATE: 63 BPM | SYSTOLIC BLOOD PRESSURE: 141 MMHG | WEIGHT: 175 LBS | BODY MASS INDEX: 24.41 KG/M2 | TEMPERATURE: 98.1 F | RESPIRATION RATE: 18 BRPM | DIASTOLIC BLOOD PRESSURE: 76 MMHG | OXYGEN SATURATION: 97 %

## 2024-05-31 DIAGNOSIS — L08.9 INFECTED LACERATION OF SKIN: Primary | ICD-10-CM

## 2024-05-31 DIAGNOSIS — T14.8XXA INFECTED LACERATION OF SKIN: Primary | ICD-10-CM

## 2024-05-31 PROCEDURE — 6370000000 HC RX 637 (ALT 250 FOR IP): Performed by: EMERGENCY MEDICINE

## 2024-05-31 PROCEDURE — 99283 EMERGENCY DEPT VISIT LOW MDM: CPT

## 2024-05-31 RX ORDER — SULFAMETHOXAZOLE AND TRIMETHOPRIM 800; 160 MG/1; MG/1
1 TABLET ORAL ONCE
Status: COMPLETED | OUTPATIENT
Start: 2024-05-31 | End: 2024-05-31

## 2024-05-31 RX ORDER — BACITRACIN ZINC AND POLYMYXIN B SULFATE 500; 1000 [USP'U]/G; [USP'U]/G
OINTMENT TOPICAL
Qty: 15 G | Refills: 1 | Status: SHIPPED | OUTPATIENT
Start: 2024-05-31 | End: 2024-06-07

## 2024-05-31 RX ORDER — SULFAMETHOXAZOLE AND TRIMETHOPRIM 800; 160 MG/1; MG/1
1 TABLET ORAL 2 TIMES DAILY
Qty: 20 TABLET | Refills: 0 | Status: SHIPPED | OUTPATIENT
Start: 2024-05-31 | End: 2024-06-10

## 2024-05-31 RX ORDER — ONDANSETRON 2 MG/ML
4 INJECTION INTRAMUSCULAR; INTRAVENOUS ONCE
Status: DISCONTINUED | OUTPATIENT
Start: 2024-05-31 | End: 2024-05-31 | Stop reason: HOSPADM

## 2024-05-31 RX ADMIN — SULFAMETHOXAZOLE AND TRIMETHOPRIM 1 TABLET: 800; 160 TABLET ORAL at 02:58

## 2024-05-31 ASSESSMENT — ENCOUNTER SYMPTOMS
GASTROINTESTINAL NEGATIVE: 1
EYES NEGATIVE: 1
RESPIRATORY NEGATIVE: 1

## 2024-05-31 NOTE — ED PROVIDER NOTES
serous drainage noted   Skin:     General: Skin is warm and dry.   Neurological:      General: No focal deficit present.      Mental Status: He is alert and oriented to person, place, and time.      GCS: GCS eye subscore is 4. GCS verbal subscore is 5. GCS motor subscore is 6.      Cranial Nerves: No cranial nerve deficit.      Motor: No abnormal muscle tone.   Psychiatric:         Behavior: Behavior normal.         Thought Content: Thought content normal.         Judgment: Judgment normal.         DIAGNOSTIC RESULTS       RADIOLOGY:   Non-plain film images such as CT, Ultrasound and MRI are read by the radiologist. Plainradiographic images are visualized and preliminarily interpreted by the emergency physician with the below findings:      Interpretation per the Radiologist below, if available at the time of this note:    No orders to display         ED BEDSIDE ULTRASOUND:   Performed by ED Physician - none    LABS:  Labs Reviewed - No data to display    All other labs were within normal range or not returned as of this dictation.    Medications   sulfamethoxazole-trimethoprim (BACTRIM DS;SEPTRA DS) 800-160 MG per tablet 1 tablet (has no administration in time range)       EMERGENCY DEPARTMENT COURSE and DIFFERENTIALDIAGNOSIS/MDM:   Vitals:    Vitals:    05/31/24 0230   BP: (!) 141/76   Pulse: 63   Resp: 18   Temp: 98.1 °F (36.7 °C)   SpO2: 97%   Weight: 79.4 kg (175 lb)     EKG: All EKG's are interpreted by the Emergency Department Physician who either signs or Co-signs this chart in the absence of a cardiologist.      MDM       Approximately 10cc of serous drainage with some purulence was expressed from the wound after removing sutures. Plan for initiation of bactrim and allowing to heal by secondary intent.  Evaluation and work-up here revealed no signs of emergent or life-threatening pathology that would necessitate admission for further work-up or management at this time.  Patient is felt to be stable for

## (undated) DEVICE — CVR BRD ARM 13X30

## (undated) DEVICE — GLV SURG DERMASSURE GRN LF PF 8.0

## (undated) DEVICE — TRAP FLD MINIVAC MEGADYNE 100ML

## (undated) DEVICE — GLV SURG TRIUMPH PF LTX 7.5 STRL

## (undated) DEVICE — ANTIBACTERIAL UNDYED BRAIDED (POLYGLACTIN 910), SYNTHETIC ABSORBABLE SUTURE: Brand: COATED VICRYL

## (undated) DEVICE — PAD UNDERCAST WYTEX 4IN 4YD LF STRL

## (undated) DEVICE — CYSTO/BLADDER IRRIGATION SET, REGULATING CLAMP

## (undated) DEVICE — PK EXTRM 30

## (undated) DEVICE — BAPTIST TURNOVER KIT: Brand: MEDLINE INDUSTRIES, INC.

## (undated) DEVICE — DRSNG WND GZ CURAD OIL EMULSION 3X3IN STRL

## (undated) DEVICE — DISPOSABLE TOURNIQUET CUFF SINGLE BLADDER, SINGLE PORT AND QUICK CONNECT CONNECTOR: Brand: COLOR CUFF

## (undated) DEVICE — BNDG ESMARK STRL 6INX12FT LF

## (undated) DEVICE — BNDG ELAS ECON W/CLIP 4IN 5YD LF STRL

## (undated) DEVICE — 4-PORT MANIFOLD: Brand: NEPTUNE 2

## (undated) DEVICE — PREP SOL POVIDONE/IODINE BT 4OZ